# Patient Record
Sex: FEMALE | Race: WHITE
[De-identification: names, ages, dates, MRNs, and addresses within clinical notes are randomized per-mention and may not be internally consistent; named-entity substitution may affect disease eponyms.]

---

## 2017-08-03 ENCOUNTER — HOSPITAL ENCOUNTER (OUTPATIENT)
Dept: HOSPITAL 53 - M WUC | Age: 42
End: 2017-08-03
Attending: PHYSICIAN ASSISTANT
Payer: COMMERCIAL

## 2017-08-03 DIAGNOSIS — E78.5: Primary | ICD-10-CM

## 2017-08-03 LAB
ALBUMIN SERPL BCG-MCNC: 4 GM/DL (ref 3.2–5.2)
ALBUMIN/GLOB SERPL: 1.29 {RATIO} (ref 1–1.93)
ALP SERPL-CCNC: 53 U/L (ref 45–117)
ALT SERPL W P-5'-P-CCNC: 16 U/L (ref 12–78)
ANION GAP SERPL CALC-SCNC: 9 MEQ/L (ref 8–16)
AST SERPL-CCNC: 13 U/L (ref 15–37)
BILIRUB SERPL-MCNC: 0.7 MG/DL (ref 0.2–1)
BUN SERPL-MCNC: 12 MG/DL (ref 7–18)
CALCIUM SERPL-MCNC: 9 MG/DL (ref 8.5–10.1)
CHLORIDE SERPL-SCNC: 105 MEQ/L (ref 98–107)
CHOLEST SERPL-MCNC: 240 MG/DL (ref ?–200)
CO2 SERPL-SCNC: 25 MEQ/L (ref 21–32)
CREAT SERPL-MCNC: 0.73 MG/DL (ref 0.55–1.02)
GFR SERPL CREATININE-BSD FRML MDRD: > 60 ML/MIN/{1.73_M2} (ref 58–?)
GLUCOSE SERPL-MCNC: 87 MG/DL (ref 70–105)
POTASSIUM SERPL-SCNC: 4.6 MEQ/L (ref 3.5–5.1)
PROT SERPL-MCNC: 7.1 GM/DL (ref 6.4–8.2)
SODIUM SERPL-SCNC: 139 MEQ/L (ref 136–145)
TRIGL SERPL-MCNC: 74 MG/DL (ref ?–150)

## 2017-09-01 ENCOUNTER — HOSPITAL ENCOUNTER (OUTPATIENT)
Dept: HOSPITAL 53 - M LAB REF | Age: 42
End: 2017-09-01
Attending: NURSE PRACTITIONER
Payer: COMMERCIAL

## 2017-09-01 DIAGNOSIS — R07.9: Primary | ICD-10-CM

## 2018-03-23 ENCOUNTER — HOSPITAL ENCOUNTER (OUTPATIENT)
Dept: HOSPITAL 53 - M OPP | Age: 43
Discharge: HOME | End: 2018-03-23
Attending: INTERNAL MEDICINE
Payer: COMMERCIAL

## 2018-03-23 DIAGNOSIS — K22.8: ICD-10-CM

## 2018-03-23 DIAGNOSIS — Z79.899: ICD-10-CM

## 2018-03-23 DIAGNOSIS — K21.9: ICD-10-CM

## 2018-03-23 DIAGNOSIS — R12: ICD-10-CM

## 2018-03-23 DIAGNOSIS — R13.10: Primary | ICD-10-CM

## 2018-03-23 DIAGNOSIS — K29.70: ICD-10-CM

## 2018-03-23 PROCEDURE — 43239 EGD BIOPSY SINGLE/MULTIPLE: CPT

## 2018-10-22 ENCOUNTER — HOSPITAL ENCOUNTER (OUTPATIENT)
Dept: HOSPITAL 53 - M WUC | Age: 43
End: 2018-10-22
Attending: PHYSICIAN ASSISTANT
Payer: COMMERCIAL

## 2018-10-22 DIAGNOSIS — R25.3: Primary | ICD-10-CM

## 2018-10-22 LAB
ANION GAP: 6 MEQ/L (ref 8–16)
BLOOD UREA NITROGEN: 11 MG/DL (ref 7–18)
CALCIUM LEVEL: 9.1 MG/DL (ref 8.5–10.1)
CARBON DIOXIDE LEVEL: 27 MEQ/L (ref 21–32)
CHLORIDE LEVEL: 106 MEQ/L (ref 98–107)
CREATININE FOR GFR: 0.74 MG/DL (ref 0.55–1.3)
GFR SERPL CREATININE-BSD FRML MDRD: > 60 ML/MIN/{1.73_M2} (ref 58–?)
GLUCOSE, FASTING: 86 MG/DL (ref 70–100)
HEMATOCRIT: 38.6 % (ref 36–47)
HEMOGLOBIN: 12.6 G/DL (ref 12–15.5)
MEAN CORPUSCULAR HEMOGLOBIN: 28.1 PG (ref 27–33)
MEAN CORPUSCULAR HGB CONC: 32.6 G/DL (ref 32–36.5)
MEAN CORPUSCULAR VOLUME: 86 FL (ref 80–96)
NRBC BLD AUTO-RTO: 0 % (ref 0–0)
PLATELET COUNT, AUTOMATED: 211 10^3/UL (ref 150–450)
POTASSIUM SERUM: 4.1 MEQ/L (ref 3.5–5.1)
RED BLOOD COUNT: 4.49 10^6/UL (ref 4–5.4)
RED CELL DISTRIBUTION WIDTH: 12.7 % (ref 11.5–14.5)
SODIUM LEVEL: 139 MEQ/L (ref 136–145)
THYROID STIMULATING HORMONE: 1.62 UIU/ML (ref 0.36–3.74)
WHITE BLOOD COUNT: 5.1 10^3/UL (ref 4–10)

## 2018-10-22 PROCEDURE — 84443 ASSAY THYROID STIM HORMONE: CPT

## 2019-06-07 ENCOUNTER — HOSPITAL ENCOUNTER (OUTPATIENT)
Dept: HOSPITAL 53 - M OPP | Age: 44
Discharge: HOME | End: 2019-06-07
Attending: INTERNAL MEDICINE
Payer: COMMERCIAL

## 2019-06-07 VITALS — WEIGHT: 186 LBS | HEIGHT: 65 IN | BODY MASS INDEX: 30.99 KG/M2

## 2019-06-07 VITALS — DIASTOLIC BLOOD PRESSURE: 60 MMHG | SYSTOLIC BLOOD PRESSURE: 124 MMHG

## 2019-06-07 DIAGNOSIS — K59.00: ICD-10-CM

## 2019-06-07 DIAGNOSIS — K29.70: ICD-10-CM

## 2019-06-07 DIAGNOSIS — K21.9: ICD-10-CM

## 2019-06-07 DIAGNOSIS — K64.8: Primary | ICD-10-CM

## 2019-06-07 DIAGNOSIS — R12: ICD-10-CM

## 2019-06-07 DIAGNOSIS — R13.10: ICD-10-CM

## 2019-06-07 DIAGNOSIS — K22.8: ICD-10-CM

## 2019-06-07 DIAGNOSIS — R19.4: ICD-10-CM

## 2019-06-07 PROCEDURE — 88305 TISSUE EXAM BY PATHOLOGIST: CPT

## 2019-06-07 PROCEDURE — 43239 EGD BIOPSY SINGLE/MULTIPLE: CPT

## 2019-06-07 PROCEDURE — 45378 DIAGNOSTIC COLONOSCOPY: CPT

## 2019-06-07 NOTE — ROOR
________________________________________________________________________________

Patient Name: Kati Stone        Procedure Date: 6/7/2019 1:27 PM

MRN: [id number]                          Account Number: [id number]

YOB: 1975                Age: 43

Room: [facility]                            Gender: Female

Note Status: Finalized                 

________________________________________________________________________________

 

Procedure:           Colonoscopy

Indications:         Change in bowel habits, Constipation

Providers:           Wilson Moy MD

Referring MD:        ERIN LACY JR, MD

Requesting Provider: 

Medicines:           Monitored Anesthesia Care

Complications:       No immediate complications.

________________________________________________________________________________

Procedure:           Pre-Anesthesia Assessment:

                     - Prior to the procedure, a History and Physical was 

                     performed, and patient medications and allergies were 

                     reviewed. The patient is competent. The risks and 

                     benefits of the procedure and the sedation options and 

                     risks were discussed with the patient. All questions were 

                     answered and informed consent was obtained. Patient 

                     identification and proposed procedure were verified by 

                     the physician, the nurse and the anesthesiologist in the 

                     procedure room. Mental Status Examination: alert and 

                     oriented. Airway Examination: normal oropharyngeal airway 

                     and neck mobility. Respiratory Examination: clear to 

                     auscultation. CV Examination: normal. Prophylactic 

                     Antibiotics: The patient does not require prophylactic 

                     antibiotics. Prior Anticoagulants: The patient has taken 

                     no previous anticoagulant or antiplatelet agents. ASA 

                     Grade Assessment: II - A patient with mild systemic 

                     disease. After reviewing the risks and benefits, the 

                     patient was deemed in satisfactory condition to undergo 

                     the procedure. The anesthesia plan was to use monitored 

                     anesthesia care (MAC). Immediately prior to 

                     administration of medications, the patient was 

                     re-assessed for adequacy to receive sedatives. The heart 

                     rate, respiratory rate, oxygen saturations, blood 

                     pressure, adequacy of pulmonary ventilation, and response 

                     to care were monitored throughout the procedure. The 

                     physical status of the patient was re-assessed after the 

                     procedure.

                     The Colonoscope was introduced through the anus and 

                     advanced to the terminal ileum, with identification of 

                     the appendiceal orifice and IC valve. The colonoscopy was 

                     performed without difficulty. The patient tolerated the 

                     procedure well. The quality of the bowel preparation was 

                     good. The terminal ileum, ileocecal valve, appendiceal 

                     orifice, and rectum were photographed. Scope insertion 

                     time was 2 minutes. Scope withdrawal time was 8 minutes. 

                     The total duration of the procedure was 10 minutes.

                                                                                

Findings:

     The perianal and digital rectal examinations were normal.

     The terminal ileum appeared normal.

     Normal mucosa was found in the entire colon.

     Non-bleeding external and internal hemorrhoids were found during 

     retroflexion. The hemorrhoids were small.

                                                                                

Impression:          - The examined portion of the ileum was normal.

                     - Normal mucosa in the entire examined colon.

                     - Non-bleeding external and internal hemorrhoids.

                     - No specimens collected.

Recommendation:      - Patient has a contact number available for emergencies. 

                     The signs and symptoms of potential delayed complications 

                     were discussed with the patient. Return to normal 

                     activities tomorrow. Written discharge instructions were 

                     provided to the patient.

                     - High fiber diet.

                     - Continue present medications.

                     - Repeat colonoscopy in 10 years for screening purposes.

                     - Return to primary care physician.

                                                                                

 

Wilson Moy MD

_______________________

Wilson Moy MD

6/7/2019 2:18:18 PM

Electronically signed by Wilson Moy MD

Number of Addenda: 0

 

Note Initiated On: 6/7/2019 1:27 PM

Estimated Blood Loss:

     Estimated blood loss: none.

## 2019-06-07 NOTE — ROOR
________________________________________________________________________________

Patient Name: Kati Stone        Procedure Date: 6/7/2019 1:26 PM

MRN: [id number]                          Account Number: [id number]

YOB: 1975                Age: 43

Room: [facility]                            Gender: Female

Note Status: Finalized                 

________________________________________________________________________________

 

Procedure:           Upper GI endoscopy

Indications:         Dysphagia, Heartburn

Providers:           Wilson Moy MD

Referring MD:        ERIN LACY JR, MD

Requesting Provider: 

Medicines:           Monitored Anesthesia Care

Complications:       No immediate complications.

________________________________________________________________________________

Procedure:           Pre-Anesthesia Assessment:

                     - Prior to the procedure, a History and Physical was 

                     performed, and patient medications and allergies were 

                     reviewed. The patient is competent. The risks and 

                     benefits of the procedure and the sedation options and 

                     risks were discussed with the patient. All questions were 

                     answered and informed consent was obtained. Patient 

                     identification and proposed procedure were verified by 

                     the physician, the nurse and the anesthesiologist in the 

                     procedure room. Mental Status Examination: alert and 

                     oriented. Airway Examination: normal oropharyngeal airway 

                     and neck mobility. Respiratory Examination: clear to 

                     auscultation. CV Examination: normal. Prophylactic 

                     Antibiotics: The patient does not require prophylactic 

                     antibiotics. Prior Anticoagulants: The patient has taken 

                     no previous anticoagulant or antiplatelet agents. ASA 

                     Grade Assessment: II - A patient with mild systemic 

                     disease. After reviewing the risks and benefits, the 

                     patient was deemed in satisfactory condition to undergo 

                     the procedure. The anesthesia plan was to use monitored 

                     anesthesia care (MAC). Immediately prior to 

                     administration of medications, the patient was 

                     re-assessed for adequacy to receive sedatives. The heart 

                     rate, respiratory rate, oxygen saturations, blood 

                     pressure, adequacy of pulmonary ventilation, and response 

                     to care were monitored throughout the procedure. The 

                     physical status of the patient was re-assessed after the 

                     procedure.

                     The Endoscope was introduced through the mouth, and 

                     advanced to the second part of duodenum. The upper GI 

                     endoscopy was accomplished without difficulty. The 

                     patient tolerated the procedure well.

                                                                                

Findings:

     The Z-line was irregular and was found 36 cm from the incisors.

     Mucosal changes including longitudinal furrows, white plaques and 

     circumferential folds were found in the middle third of the esophagus and 

     in the lower third of the esophagus. Biopsies were obtained from the 

     proximal and distal esophagus with cold forceps for histology of 

     suspected eosinophilic esophagitis. Verification of patient 

     identification for the specimen was done by the physician and nurse using 

     the patient's name, birth date and medical record number. Estimated blood 

     loss was minimal.

     Scattered mild inflammation characterized by erythema and granularity was 

     found in the gastric antrum. Biopsies were taken with a cold forceps for 

     Helicobacter pylori testing.

     The duodenal bulb and second portion of the duodenum were normal.

                                                                                

Impression:          - Z-line irregular, 36 cm from the incisors.

                     - Esophageal mucosal changes suspicious for eosinophilic 

                     esophagitis. Biopsied.

                     - Gastritis. Biopsied.

                     - Normal duodenal bulb and second portion of the duodenum.

Recommendation:      - Patient has a contact number available for emergencies. 

                     The signs and symptoms of potential delayed complications 

                     were discussed with the patient. Return to normal 

                     activities tomorrow. Written discharge instructions were 

                     provided to the patient.

                     - High fiber diet.

                     - Follow an antireflux regimen.

                     - Continue present medications.

                     - Await pathology results.

                     - Telephone GI clinic for pathology results in 2 weeks.

                     - Return to primary care physician.

                                                                                

 

Wilson Moy MD

_______________________

Wilson Moy MD

6/7/2019 2:15:01 PM

Electronically signed by Wilson Moy MD

Number of Addenda: 0

 

Note Initiated On: 6/7/2019 1:26 PM

Estimated Blood Loss:

     Estimated blood loss was minimal.

## 2019-10-24 ENCOUNTER — HOSPITAL ENCOUNTER (OUTPATIENT)
Dept: HOSPITAL 53 - M LAB REF | Age: 44
End: 2019-10-24
Attending: ADVANCED PRACTICE MIDWIFE
Payer: COMMERCIAL

## 2019-10-24 DIAGNOSIS — B00.9: Primary | ICD-10-CM

## 2020-01-04 ENCOUNTER — HOSPITAL ENCOUNTER (EMERGENCY)
Dept: HOSPITAL 53 - M ED | Age: 45
Discharge: HOME | End: 2020-01-04
Payer: COMMERCIAL

## 2020-01-04 VITALS — DIASTOLIC BLOOD PRESSURE: 83 MMHG | SYSTOLIC BLOOD PRESSURE: 139 MMHG

## 2020-01-04 VITALS — BODY MASS INDEX: 31.72 KG/M2 | HEIGHT: 65 IN | WEIGHT: 190.39 LBS

## 2020-01-04 DIAGNOSIS — Z79.899: ICD-10-CM

## 2020-01-04 DIAGNOSIS — R00.2: Primary | ICD-10-CM

## 2020-01-04 DIAGNOSIS — K21.9: ICD-10-CM

## 2020-01-04 LAB
B-HCG SERPL QL: NEGATIVE
BASOPHILS # BLD AUTO: 0 10^3/UL (ref 0–0.2)
BASOPHILS NFR BLD AUTO: 0.4 % (ref 0–1)
BUN SERPL-MCNC: 9 MG/DL (ref 7–18)
CALCIUM SERPL-MCNC: 8.8 MG/DL (ref 8.5–10.1)
CHLORIDE SERPL-SCNC: 107 MEQ/L (ref 98–107)
CK MB CFR.DF SERPL CALC: 0.98
CK MB SERPL-MCNC: < 1 NG/ML (ref ?–3.6)
CK SERPL-CCNC: 102 U/L (ref 26–192)
CO2 SERPL-SCNC: 27 MEQ/L (ref 21–32)
CREAT SERPL-MCNC: 0.73 MG/DL (ref 0.55–1.3)
EOSINOPHIL # BLD AUTO: 0.1 10^3/UL (ref 0–0.5)
EOSINOPHIL NFR BLD AUTO: 1 % (ref 0–3)
GFR SERPL CREATININE-BSD FRML MDRD: > 60 ML/MIN/{1.73_M2} (ref 58–?)
GLUCOSE SERPL-MCNC: 78 MG/DL (ref 70–100)
HCT VFR BLD AUTO: 39.9 % (ref 36–47)
HGB BLD-MCNC: 12.5 G/DL (ref 12–15.5)
LYMPHOCYTES # BLD AUTO: 1.8 10^3/UL (ref 1.5–5)
LYMPHOCYTES NFR BLD AUTO: 36.3 % (ref 24–44)
MAGNESIUM SERPL-MCNC: 2.1 MG/DL (ref 1.8–2.4)
MCH RBC QN AUTO: 26.5 PG (ref 27–33)
MCHC RBC AUTO-ENTMCNC: 31.3 G/DL (ref 32–36.5)
MCV RBC AUTO: 84.5 FL (ref 80–96)
MONOCYTES # BLD AUTO: 0.4 10^3/UL (ref 0–0.8)
MONOCYTES NFR BLD AUTO: 7.5 % (ref 0–5)
NEUTROPHILS # BLD AUTO: 2.7 10^3/UL (ref 1.5–8.5)
NEUTROPHILS NFR BLD AUTO: 54.4 % (ref 36–66)
PLATELET # BLD AUTO: 275 10^3/UL (ref 150–450)
POTASSIUM SERPL-SCNC: 4 MEQ/L (ref 3.5–5.1)
RBC # BLD AUTO: 4.72 10^6/UL (ref 4–5.4)
SODIUM SERPL-SCNC: 140 MEQ/L (ref 136–145)
T4 FREE SERPL-MCNC: 0.97 NG/DL (ref 0.76–1.46)
TROPONIN I SERPL-MCNC: < 0.02 NG/ML (ref ?–0.1)
TSH SERPL DL<=0.005 MIU/L-ACNC: 2.12 UIU/ML (ref 0.36–3.74)
WBC # BLD AUTO: 5 10^3/UL (ref 4–10)

## 2020-01-04 NOTE — REP
Chest x-ray:  Two views.

 

History:  Chest pain.  No comparison study.

 

Findings:  Monitoring electrodes overlie the chest.  Lungs are symmetrically

aerated and free of infiltrate.  Pleural angles are sharp.  Heart size is normal.

No significant bony abnormality.

 

Impression:

 

Negative chest x-ray.

 

 

Electronically Signed by

Colton Carr MD 01/04/2020 07:03 P

## 2020-01-05 NOTE — ECGEPIP
[facility] - ED

                                       

                                       Test Date:    2020

Pat Name:     MARGOTH MONTERROSO        Department:   

Patient ID:   [id number]                 Room:         -

Gender:       Female                   Technician:   savannah

:          1975               Requested By: SUSANA DAVIS

Order Number: [id number]     Reading MD:   Calvin Kingston

                                 Measurements

Intervals                              Axis          

Rate:         94                       P:            41

WY:           159                      QRS:          -10

QRSD:         89                       T:            43

QT:           338                                    

QTc:          424                                    

                           Interpretive Statements

SINUS RHYTHM

POOR R WAVE PROGRESSION

NO PRIORS FOR COMPARISON

Electronically Signed on 2020 5:55:17 EST by Calvin Kingston

## 2020-01-07 ENCOUNTER — HOSPITAL ENCOUNTER (OUTPATIENT)
Dept: HOSPITAL 53 - M EKG | Age: 45
End: 2020-01-07
Attending: INTERNAL MEDICINE
Payer: COMMERCIAL

## 2020-01-07 DIAGNOSIS — R00.2: Primary | ICD-10-CM

## 2020-02-24 ENCOUNTER — HOSPITAL ENCOUNTER (OUTPATIENT)
Dept: HOSPITAL 53 - M RAD | Age: 45
End: 2020-02-24
Attending: PHYSICIAN ASSISTANT
Payer: COMMERCIAL

## 2020-02-24 DIAGNOSIS — K21.0: Primary | ICD-10-CM

## 2020-02-24 DIAGNOSIS — R07.9: ICD-10-CM

## 2020-02-24 NOTE — REP
Examination Requested: Upper G.I. Series With KUB

 

Reason For Exam: Gastroesophageal reflux disease with esophagitis, chest pain

 

Upper GI Air Contrast

 

The procedure was performed by MARYANN Gil, under the direct

supervision of Dr. Carr.  The images were reviewed with Dr. Carr.

 

The  film shows no organomegaly or pathological masses.  The intestinal gas

pattern appears normal.

 

Liquid barium and gas producing crystals were given in the erect position as well

as liquid barium in the prone oblique position in order to perform a double

contrast upper GI examination.

 

The oral and pharyngeal stages of deglutition were unremarkable.  Esophageal

transport is efficient and there is no esophagitis, stricture, or mucosal ring

noted.  There is no hiatal hernia. Gastroesophageal reflux was visualized to the

thoracic inlet.

 

The stomach walls are normally outlined.  The rugal folds are smooth and regular.

There is no gastritis, neoplasm, ulcer disease noted.

 

The duodenal walls are normally outlined.  The mucosal folds are smooth and

regular.  There is no duodenitis, peptic ulcer disease, or neoplasm noted. The

visualized portion of the proximal small bowel appears normal in course and

caliber.

 

Impression:

1. Gastroesophageal reflux to the level of the thoracic inlet.

 

 

0.6 minutes of fluoroscopy time was utilized for this procedure.  Some

fluoroscopic images are performed with last image hold technology.  These images

require no additional radiation.

 

 

Reviewed by

MARYANN Carrillo 02/24/2020 03:54 P

Electronically Signed by

Colton Carr MD 02/24/2020 04:31 P

## 2021-02-21 ENCOUNTER — HOSPITAL ENCOUNTER (OUTPATIENT)
Dept: HOSPITAL 53 - M LAB | Age: 46
End: 2021-02-21
Attending: PHYSICIAN ASSISTANT
Payer: COMMERCIAL

## 2021-02-21 DIAGNOSIS — R19.7: Primary | ICD-10-CM

## 2021-02-21 LAB
ALBUMIN SERPL BCG-MCNC: 3.7 GM/DL (ref 3.2–5.2)
ALT SERPL W P-5'-P-CCNC: 22 U/L (ref 12–78)
BASOPHILS # BLD AUTO: 0 10^3/UL (ref 0–0.2)
BASOPHILS NFR BLD AUTO: 0.3 % (ref 0–1)
BILIRUB SERPL-MCNC: 0.5 MG/DL (ref 0.2–1)
BUN SERPL-MCNC: 6 MG/DL (ref 7–18)
CALCIUM SERPL-MCNC: 8.6 MG/DL (ref 8.5–10.1)
CHLORIDE SERPL-SCNC: 106 MEQ/L (ref 98–107)
CO2 SERPL-SCNC: 26 MEQ/L (ref 21–32)
CREAT SERPL-MCNC: 0.72 MG/DL (ref 0.55–1.3)
EOSINOPHIL # BLD AUTO: 0.1 10^3/UL (ref 0–0.5)
EOSINOPHIL NFR BLD AUTO: 1.1 % (ref 0–3)
GFR SERPL CREATININE-BSD FRML MDRD: > 60 ML/MIN/{1.73_M2} (ref 58–?)
GLUCOSE SERPL-MCNC: 89 MG/DL (ref 70–100)
HCT VFR BLD AUTO: 38.5 % (ref 36–47)
HGB BLD-MCNC: 11.8 G/DL (ref 12–15.5)
LYMPHOCYTES # BLD AUTO: 2.1 10^3/UL (ref 1.5–5)
LYMPHOCYTES NFR BLD AUTO: 30.8 % (ref 24–44)
MCH RBC QN AUTO: 25.1 PG (ref 27–33)
MCHC RBC AUTO-ENTMCNC: 30.6 G/DL (ref 32–36.5)
MCV RBC AUTO: 81.7 FL (ref 80–96)
MONOCYTES # BLD AUTO: 0.5 10^3/UL (ref 0–0.8)
MONOCYTES NFR BLD AUTO: 8 % (ref 2–8)
NEUTROPHILS # BLD AUTO: 4 10^3/UL (ref 1.5–8.5)
NEUTROPHILS NFR BLD AUTO: 59.6 % (ref 36–66)
PLATELET # BLD AUTO: 244 10^3/UL (ref 150–450)
POTASSIUM SERPL-SCNC: 3.4 MEQ/L (ref 3.5–5.1)
PROT SERPL-MCNC: 7.3 GM/DL (ref 6.4–8.2)
RBC # BLD AUTO: 4.71 10^6/UL (ref 4–5.4)
SODIUM SERPL-SCNC: 138 MEQ/L (ref 136–145)
WBC # BLD AUTO: 6.7 10^3/UL (ref 4–10)

## 2021-04-12 ENCOUNTER — HOSPITAL ENCOUNTER (OUTPATIENT)
Dept: HOSPITAL 53 - M LABSMTC | Age: 46
End: 2021-04-12
Attending: PEDIATRICS
Payer: SELF-PAY

## 2021-04-12 DIAGNOSIS — Z20.822: Primary | ICD-10-CM

## 2021-09-23 ENCOUNTER — HOSPITAL ENCOUNTER (OUTPATIENT)
Dept: HOSPITAL 53 - M SFHCWAGY | Age: 46
End: 2021-09-23
Attending: NURSE PRACTITIONER
Payer: COMMERCIAL

## 2021-09-23 DIAGNOSIS — Z12.4: Primary | ICD-10-CM

## 2021-09-23 PROCEDURE — 87624 HPV HI-RISK TYP POOLED RSLT: CPT

## 2022-05-09 ENCOUNTER — HOSPITAL ENCOUNTER (OUTPATIENT)
Dept: HOSPITAL 53 - M WUC | Age: 47
End: 2022-05-09
Attending: NURSE PRACTITIONER
Payer: COMMERCIAL

## 2022-05-09 DIAGNOSIS — R06.02: Primary | ICD-10-CM

## 2022-05-09 DIAGNOSIS — Z86.16: ICD-10-CM

## 2023-05-25 ENCOUNTER — HOSPITAL ENCOUNTER (OUTPATIENT)
Dept: HOSPITAL 53 - M PLALAB | Age: 48
End: 2023-05-25
Attending: ADVANCED PRACTICE MIDWIFE
Payer: COMMERCIAL

## 2023-05-25 DIAGNOSIS — Z12.4: Primary | ICD-10-CM

## 2023-05-25 PROCEDURE — 87624 HPV HI-RISK TYP POOLED RSLT: CPT

## 2023-06-21 ENCOUNTER — HOSPITAL ENCOUNTER (OUTPATIENT)
Dept: HOSPITAL 53 - M WHC | Age: 48
End: 2023-06-21
Attending: ADVANCED PRACTICE MIDWIFE
Payer: COMMERCIAL

## 2023-06-21 DIAGNOSIS — N83.8: ICD-10-CM

## 2023-06-21 DIAGNOSIS — R10.2: Primary | ICD-10-CM

## 2023-09-08 ENCOUNTER — HOSPITAL ENCOUNTER (OUTPATIENT)
Dept: HOSPITAL 53 - M WHC | Age: 48
End: 2023-09-08
Attending: ADVANCED PRACTICE MIDWIFE
Payer: COMMERCIAL

## 2023-09-08 DIAGNOSIS — R10.2: Primary | ICD-10-CM

## 2023-09-08 DIAGNOSIS — R19.04: ICD-10-CM

## 2023-10-26 ENCOUNTER — HOSPITAL ENCOUNTER (OUTPATIENT)
Dept: HOSPITAL 53 - M LABDRAWP | Age: 48
End: 2023-10-26
Attending: OBSTETRICS & GYNECOLOGY
Payer: COMMERCIAL

## 2023-10-26 DIAGNOSIS — N83.202: Primary | ICD-10-CM

## 2024-08-30 ENCOUNTER — HOSPITAL ENCOUNTER (OUTPATIENT)
Dept: HOSPITAL 53 - M PLALAB | Age: 49
End: 2024-08-30
Attending: OBSTETRICS & GYNECOLOGY
Payer: COMMERCIAL

## 2024-08-30 DIAGNOSIS — N93.9: Primary | ICD-10-CM

## 2024-08-30 LAB
HCT VFR BLD AUTO: 41 % (ref 36–47)
HGB BLD-MCNC: 13.9 G/DL (ref 12–15.5)
MCH RBC QN AUTO: 29.8 PG (ref 27–33)
MCHC RBC AUTO-ENTMCNC: 33.9 G/DL (ref 32–36.5)
MCV RBC AUTO: 88 FL (ref 80–96)
PLATELET # BLD AUTO: 227 10^3/UL (ref 150–450)
RBC # BLD AUTO: 4.66 10^6/UL (ref 4–5.4)
WBC # BLD AUTO: 5.1 10^3/UL (ref 4–10)

## 2024-09-26 ENCOUNTER — HOSPITAL ENCOUNTER (OUTPATIENT)
Dept: HOSPITAL 53 - M SFHCWAGY | Age: 49
End: 2024-09-26
Attending: OBSTETRICS & GYNECOLOGY
Payer: COMMERCIAL

## 2024-09-26 DIAGNOSIS — N93.9: Primary | ICD-10-CM

## 2024-09-30 ENCOUNTER — HOSPITAL ENCOUNTER (OUTPATIENT)
Dept: HOSPITAL 53 - M WHC | Age: 49
End: 2024-09-30
Attending: OBSTETRICS & GYNECOLOGY
Payer: COMMERCIAL

## 2024-09-30 DIAGNOSIS — R93.89: ICD-10-CM

## 2024-09-30 DIAGNOSIS — N93.9: Primary | ICD-10-CM

## 2025-01-08 ENCOUNTER — HOSPITAL ENCOUNTER (OUTPATIENT)
Dept: HOSPITAL 53 - M SDC | Age: 50
Setting detail: OBSERVATION
LOS: 1 days | Discharge: HOME | End: 2025-01-09
Attending: OBSTETRICS & GYNECOLOGY | Admitting: OBSTETRICS & GYNECOLOGY
Payer: COMMERCIAL

## 2025-01-08 VITALS — SYSTOLIC BLOOD PRESSURE: 132 MMHG | DIASTOLIC BLOOD PRESSURE: 77 MMHG | OXYGEN SATURATION: 95 % | TEMPERATURE: 97.9 F

## 2025-01-08 VITALS — TEMPERATURE: 97.5 F | SYSTOLIC BLOOD PRESSURE: 132 MMHG | OXYGEN SATURATION: 95 % | DIASTOLIC BLOOD PRESSURE: 76 MMHG

## 2025-01-08 VITALS — OXYGEN SATURATION: 97 % | DIASTOLIC BLOOD PRESSURE: 79 MMHG | SYSTOLIC BLOOD PRESSURE: 134 MMHG | TEMPERATURE: 97.7 F

## 2025-01-08 VITALS — OXYGEN SATURATION: 95 % | DIASTOLIC BLOOD PRESSURE: 77 MMHG | SYSTOLIC BLOOD PRESSURE: 132 MMHG | TEMPERATURE: 97.9 F

## 2025-01-08 VITALS — SYSTOLIC BLOOD PRESSURE: 135 MMHG | OXYGEN SATURATION: 98 % | TEMPERATURE: 97.5 F | DIASTOLIC BLOOD PRESSURE: 80 MMHG

## 2025-01-08 VITALS — DIASTOLIC BLOOD PRESSURE: 71 MMHG | OXYGEN SATURATION: 97 % | TEMPERATURE: 97.5 F | SYSTOLIC BLOOD PRESSURE: 128 MMHG

## 2025-01-08 VITALS — HEIGHT: 65 IN | WEIGHT: 185.63 LBS | BODY MASS INDEX: 30.93 KG/M2

## 2025-01-08 VITALS — DIASTOLIC BLOOD PRESSURE: 78 MMHG | OXYGEN SATURATION: 98 % | SYSTOLIC BLOOD PRESSURE: 126 MMHG | TEMPERATURE: 98.1 F

## 2025-01-08 DIAGNOSIS — K21.9: ICD-10-CM

## 2025-01-08 DIAGNOSIS — E78.5: ICD-10-CM

## 2025-01-08 DIAGNOSIS — N83.202: ICD-10-CM

## 2025-01-08 DIAGNOSIS — Z79.899: ICD-10-CM

## 2025-01-08 DIAGNOSIS — N93.9: Primary | ICD-10-CM

## 2025-01-08 DIAGNOSIS — Z83.3: ICD-10-CM

## 2025-01-08 LAB
HCT VFR BLD AUTO: 42.6 % (ref 36–47)
HGB BLD-MCNC: 14.4 G/DL (ref 12–15.5)
MCH RBC QN AUTO: 28.9 PG (ref 27–33)
MCHC RBC AUTO-ENTMCNC: 33.8 G/DL (ref 32–36.5)
MCV RBC AUTO: 85.5 FL (ref 80–96)
PLATELET # BLD AUTO: 244 10^3/UL (ref 150–450)
RBC # BLD AUTO: 4.98 10^6/UL (ref 4–5.4)
WBC # BLD AUTO: 4.2 10^3/UL (ref 4–10)

## 2025-01-08 PROCEDURE — 96374 THER/PROPH/DIAG INJ IV PUSH: CPT

## 2025-01-08 PROCEDURE — 86900 BLOOD TYPING SEROLOGIC ABO: CPT

## 2025-01-08 PROCEDURE — 58571 TLH W/T/O 250 G OR LESS: CPT

## 2025-01-08 PROCEDURE — 86901 BLOOD TYPING SEROLOGIC RH(D): CPT

## 2025-01-08 PROCEDURE — 86850 RBC ANTIBODY SCREEN: CPT

## 2025-01-08 PROCEDURE — 85027 COMPLETE CBC AUTOMATED: CPT

## 2025-01-08 PROCEDURE — 36415 COLL VENOUS BLD VENIPUNCTURE: CPT

## 2025-01-08 PROCEDURE — 96376 TX/PRO/DX INJ SAME DRUG ADON: CPT

## 2025-01-08 PROCEDURE — 88307 TISSUE EXAM BY PATHOLOGIST: CPT

## 2025-01-08 PROCEDURE — 81025 URINE PREGNANCY TEST: CPT

## 2025-01-08 RX ADMIN — SODIUM CHLORIDE, POTASSIUM CHLORIDE, SODIUM LACTATE AND CALCIUM CHLORIDE SCH MLS/HR: 600; 310; 30; 20 INJECTION, SOLUTION INTRAVENOUS at 17:14

## 2025-01-08 RX ADMIN — SILVER NITRATE APPLICATORS ONE EA: 25; 75 STICK TOPICAL at 15:19

## 2025-01-08 RX ADMIN — METHYLENE BLUE ONE ML: 5 INJECTION INTRAVENOUS at 14:55

## 2025-01-08 RX ADMIN — KETOROLAC TROMETHAMINE SCH MG: 30 INJECTION, SOLUTION INTRAMUSCULAR at 20:09

## 2025-01-08 RX ADMIN — ONDANSETRON PRN MG: 2 INJECTION INTRAMUSCULAR; INTRAVENOUS at 15:55

## 2025-01-08 RX ADMIN — CEFAZOLIN SODIUM ONE MLS/HR: 2 SOLUTION INTRAVENOUS at 14:00

## 2025-01-08 RX ADMIN — DOCUSATE SODIUM SCH MG: 100 CAPSULE, LIQUID FILLED ORAL at 20:09

## 2025-01-09 VITALS — OXYGEN SATURATION: 98 % | TEMPERATURE: 97.9 F | DIASTOLIC BLOOD PRESSURE: 71 MMHG | SYSTOLIC BLOOD PRESSURE: 120 MMHG

## 2025-01-09 VITALS — TEMPERATURE: 98.1 F | DIASTOLIC BLOOD PRESSURE: 79 MMHG | SYSTOLIC BLOOD PRESSURE: 129 MMHG | OXYGEN SATURATION: 96 %

## 2025-01-09 VITALS — DIASTOLIC BLOOD PRESSURE: 65 MMHG | SYSTOLIC BLOOD PRESSURE: 125 MMHG | OXYGEN SATURATION: 97 % | TEMPERATURE: 97.9 F

## 2025-01-09 VITALS — DIASTOLIC BLOOD PRESSURE: 80 MMHG | OXYGEN SATURATION: 98 % | TEMPERATURE: 97.9 F | SYSTOLIC BLOOD PRESSURE: 133 MMHG

## 2025-03-05 NOTE — HOLTMON
[facility]

                                       

                                       Test Date:    2020

Pat Name:     MARGOTH MONTERROSO        Department:   

Patient ID:   [id number]                 Room:         -

Gender:       Female                   Technician:   Kimberly Rao/JOSEE ACUÑA

:          1975               Requested By: SUSANA DAVIS

Order Number: [id number]     Reading MD:   Everton Lance

                           Interpretive Statements

underlying sinus rhythm with rate that varied between 50 to at 4:11 AM and

260 bpm at 12:32 PM, averaging 72 bpm.

very rare isolated PACs and atrial couplets but no PSVT

Very rare isolated PVCs.

patient made 4 diary entries noting "felt palpitations" but rhythms at these

times were absolutely regular and normal.

Study findings well within normal limits

 

Electronically Signed on 1- 17:51:51 EST by Everton Lance [id number]